# Patient Record
(demographics unavailable — no encounter records)

---

## 2024-11-15 NOTE — HISTORY OF PRESENT ILLNESS
[Yes] : Patient goes to dentist yearly [Grade ___] : Grade [unfilled] [Special Education] : special education  [Mother] : mother [Premenarche] : premenarche [Playtime (60 min/d)] : playtime 60 min a day [Participates in after-school activities] : participates in after-school activities [Has Friends] : has friends [No] : No cigarette smoke exposure [Appropriately restrained in motor vehicle] : appropriately restrained in motor vehicle [Supervised outdoor play] : supervised outdoor play [Up to date] : Up to date [Exposure to alcohol] : no exposure to alcohol [Exposure to tobacco] : no exposure to tobacco [Exposure to electronic nicotine delivery system] : No exposure to electronic nicotine delivery system [Exposure to illicit drugs] : no exposure to illicit drugs [de-identified] : Patient with hx of poor weight gain, emesis, and stomach pain. Following wth GI and endocrinology. Endorine workup negative. Previous workup for EOE with GI, patient had previously avoided dairy and prescribed PPI and cyproheptadine. Currently, not believed to have EOE,  no dietary restrictions or medicines. Patient continues to have picky eating, will eat fruit, Ramen noddle, and fish. Will intermittently refuse preferred foods.  [FreeTextEntry3] : Will sleep through the night, mom endorses patient will talk in her sleep. [FreeTextEntry8] : small hard stools, no blood in stool or distension. Denies emesis or diarrhea.  [de-identified] : Dentist over the summer, saw orthodontist last week for retained baby teeth.  [de-identified] : resource room for phonics, has an extra teacher in classroom, has IEP in the classroom.  [NO] : No

## 2024-11-15 NOTE — PHYSICAL EXAM
[Alert] : alert [No Acute Distress] : no acute distress [Normocephalic] : normocephalic [Conjunctivae with no discharge] : conjunctivae with no discharge [PERRL] : PERRL [EOMI Bilateral] : EOMI bilateral [Auricles Well Formed] : auricles well formed [Clear Tympanic membranes with present light reflex and bony landmarks] : clear tympanic membranes with present light reflex and bony landmarks [No Discharge] : no discharge [Nares Patent] : nares patent [Pink Nasal Mucosa] : pink nasal mucosa [Palate Intact] : palate intact [Nonerythematous Oropharynx] : nonerythematous oropharynx [Supple, full passive range of motion] : supple, full passive range of motion [No Palpable Masses] : no palpable masses [Symmetric Chest Rise] : symmetric chest rise [Clear to Auscultation Bilaterally] : clear to auscultation bilaterally [Regular Rate and Rhythm] : regular rate and rhythm [Normal S1, S2 present] : normal S1, S2 present [No Murmurs] : no murmurs [+2 Femoral Pulses] : +2 femoral pulses [Soft] : soft [NonTender] : non tender [Non Distended] : non distended [Normoactive Bowel Sounds] : normoactive bowel sounds [No Hepatomegaly] : no hepatomegaly [No Splenomegaly] : no splenomegaly [Patent] : patent [No fissures] : no fissures [No Abnormal Lymph Nodes Palpated] : no abnormal lymph nodes palpated [No Gait Asymmetry] : no gait asymmetry [No pain or deformities with palpation of bone, muscles, joints] : no pain or deformities with palpation of bone, muscles, joints [Normal Muscle Tone] : normal muscle tone [Straight] : straight [+2 Patella DTR] : +2 patella DTR [Cranial Nerves Grossly Intact] : cranial nerves grossly intact [No Rash or Lesions] : no rash or lesions [FreeTextEntry1] : small for age

## 2024-11-15 NOTE — REVIEW OF SYSTEMS
[Constipation] : constipation [Negative] : Genitourinary [Nausea] : no nausea [Vomiting] : no vomiting [Diarrhea] : no diarrhea

## 2024-11-15 NOTE — DISCUSSION/SUMMARY
[School] : school [Development and Mental Health] : development and mental health [Nutrition and Physical Activity] : nutrition and physical activity [Oral Health] : oral health [Safety] : safety [FreeTextEntry1] : Patient is a 10yo female with hx of poor weight gain and restricted diet presenting for 10yo [facility]. No concerns with growth or development at this time, patient gained 4lb since last year. Persistent picky eating and constipation, patient will intermittently refuse preferred foods. Endorses some stomach pain when eating certain foods, but no hx of worsening emesis or diarrhea. Constipation likely i/s/o restricted diet. Follows with GI and endocrine, current workup negative for medical etiology of symptoms. Currently with no daily medicines or recommended dietary restrictions. Continue to follow with subspecialists. Due for lipid screening, add with next lab draw with subspecialist. Behavioral concerns of inattentiveness and difficulty completing work at school, has IEP this year. UTD on vaccines, mom will make nursing appointment for flu vaccine. refused fluzone because going away for weekend-discxussed thatt here has already been a pediatric death associated with influenza-still refused  #HCM -nursing visit for flu vaccine, mom to make appointment  -lipid screening with next blood draw -return in one year for 9yo [facility] [place] or sooner if needed.   #poor weight gain -4lb weight gain since last year, appropriate for age  -continue f/u with GI and endocrinology

## 2024-12-20 NOTE — ASSESSMENT
[FreeTextEntry1] : Estelle is a 8-year 9-month  old female with PMHx of EOE who presents for follow-up of poor weight gain and short stature.    Given significant delay in bone age and growth velocity that is mildly low at 4.9 cm/year today, We will move forward with growth hormone stimulation test to rule out growth hormone deficiency.  Discussed with parent the growth hormone stimulation test. Growth hormone cannot be measured on a random blood draw as it is made in REM sleep, and needs certain medications to be given in order to stimulate the pituitary gland to produce growth hormone so that it may be measured during the day. Labs are drawn at baseline when an IV is placed and growth hormone levels are checked every 30 minutes thereafter.  The medications used include arginine which children unusually tolerate well.   The second medication is called clonidine which may cause drowsiness, and sometimes low blood pressures. For the low blood pressures, we give IV fluids throughout the test, with frequent blood pressure monitoring. For the sleepiness- parents are usually advised to cancel all activities for the rest of the day.  At time of stim also obtain IGF-I, IGFBP-3, ESR, CRP, TSH, free T4  I have also noted concerned about complains of intermittent belly pain and inability to gain weight.  I have spoken to Dr. Donovan who noted that last endoscopy was normal and she has recommended avoidance of milk products given symptoms.

## 2024-12-20 NOTE — HISTORY OF PRESENT ILLNESS
[FreeTextEntry2] : Estelle is a 8 year 9 month old female with PMHx of EOE who presents for follow-up of poor weight gain and short stature.    At initial visit at 6 years of age, it was noted that she has been seen by GI almost her whole life for poor weight gain and growth. Mother very concerned that she has very poor weight gain. Mother says she's a very good eater- will eat rice, pasta, chicken, snacks, fish; has more than three meals a day. Has also changed to cooking with avocado oil and supplementing with butter. Was given duocal supplements by GI, Estelle will often not eat them- says that pediasure also hurts her stomach and will cause her to have diarrhea.She is has just started dairy again after an eczema outbreak but eats a lot of soy milk. Per mom, she is never able jing gain more than 35 lbs. GI concerned because height velocity has also been slowing as well recently. Mom also notes that she is very active and always running around.  Review of growth charts shows steady linear growth in the 30th percentile with recent deceleration to the 7th percentile at time of initial appointment.  BMI consistently well below the 1rst percentile.  GI labs obtained in July 2021 revealed TFTs, celiac panel, inflammatory markers all within normal limits.  Given that BMI was more severely affected than linear growth, caloric optimization was recommended with close follow-up. At her last visit in November 2023, linear growth was noted in the 9th percentile, with an annualized growth velocity of 5.51 cm/year annualized from visit prior.    Mom note4 they have trialed cyproheptadine in the past but she became very fatigued.  They have been trying PediaSure 1.5 recommended by  but Estelle is sick of this taste.   Bone age was obtained after last visit and noted to be delayed to 5 years 9 months at chronological age 8 years 2 months. Estelle and mom present today for follow-up.  She has been well and denies any systemic complaints.  She has not started puberty.  She has gained 3 pounds since her last visit.  Mom notes that she gained 6 pounds but recently lost in the setting of illness which is frustrating to mom.  Linear growth continues in the 8th percentile with a growth velocity of 4.92 cm/year, annual eye since last visit.  Mom notes that at times she still does complain of belly pain specifically to whole milk.  Mom continues to be frustrated to despite eating well she does not gain any weight.  Maternal height 64 Paternal height 67 Paternal aunt <50" MPTH 63"  Family history is not notable for anyone with short stature, thyroid disease, diabetes though mom does know a history of early puberty with menarche at age 9. ESTELLE has abdominal pain and weight loss, but no headaches, no knee pain, no hip pain, no constipation, no cold intolerance, no sweating, no palpitations, no nervousness, no heat intolerance, no fatigue, no nausea and no vomiting.   maybe breast dev no hair  gained 3 lbs sine summer some odor.   may 24 	6.08 cm/yr  [employer]  cruise last week,

## 2024-12-20 NOTE — PHYSICAL EXAM
[Healthy Appearing] : healthy appearing [Well Nourished] : well nourished [Interactive] : interactive [Normal Appearance] : normal appearance [Well formed] : well formed [Normally Set] : normally set [Normal S1 and S2] : normal S1 and S2 [Clear to Ausculation Bilaterally] : clear to auscultation bilaterally [Abdomen Soft] : soft [Abdomen Tenderness] : non-tender [] : no hepatosplenomegaly [Normal] : normal  [Murmur] : no murmurs [de-identified] : + Eczema on left arm  [de-identified] : no thyromegaly or nodules noted

## 2024-12-20 NOTE — PHYSICAL EXAM
[Healthy Appearing] : healthy appearing [Well Nourished] : well nourished [Interactive] : interactive [Normal Appearance] : normal appearance [Well formed] : well formed [Normally Set] : normally set [Normal S1 and S2] : normal S1 and S2 [Clear to Ausculation Bilaterally] : clear to auscultation bilaterally [Abdomen Soft] : soft [Abdomen Tenderness] : non-tender [] : no hepatosplenomegaly [Normal] : normal  [Murmur] : no murmurs [de-identified] : + Eczema on left arm  [de-identified] : no thyromegaly or nodules noted

## 2025-01-21 NOTE — HISTORY OF PRESENT ILLNESS
[de-identified] : Sore throat, headache, emesis [FreeTextEntry6] : Estelle is a 9-year-old F coming in for fever and sore throat:   Sunday, started to have sore throat  Got worse last night.  Sunday night, nose feels stuffy  Also complaining of headache intermittent Felt a little warm yesterday, gave some Motrin yesterday.  Today, had one episode around 11am this morning Energy level decreased over the last day.  Mom has used Vicks for congestion, Motrin given for fever.  She had emesis after eating some noodles.  No one else is sick at home.

## 2025-03-19 NOTE — REASON FOR VISIT
[Initial Consultation] : an initial consultation for [Mother] : mother [FreeTextEntry3] : food allergy concern

## 2025-03-19 NOTE — HISTORY OF PRESENT ILLNESS
[de-identified] : Patient with recurrent vomiting after the ingestion of dairy products - abdominal pain with vomiting - endoscopies performed - mother told of diagnosis of EOE since early childhood - never had the sensation of food being lodged in her throat    She was treated with PPI medications with good control of her symptoms.    She has avoided dairy products - she will occasionally have pizza - cheese - ice cream and yogurt at this time with no symptoms.    Referred to endocrinology for growth delay - she is presently not being treated with growth hormone therapy.    Last month she developed fever and throat pain - HA - diarrhea - symptoms improved and then recurred - stool culture performed and negative results - her symptoms come and go now - associated with headache - sore throat - congestion.    History of eczema -   Patient has a history of campylobacter infection

## 2025-03-19 NOTE — PHYSICAL EXAM
[Alert] : alert [No Acute Distress] : no acute distress [Normal Nasal Mucosa] : the nasal mucosa was normal [No Neck Mass] : no neck mass was observed [No LAD] : no lymphadenopathy [Normal Rate and Effort] : normal respiratory rhythm and effort [No Crackles] : no crackles [Normal Rate] : heart rate was normal  [Normal S1, S2] : normal S1 and S2 [Regular Rhythm] : with a regular rhythm [Skin Intact] : skin intact  [No Cyanosis] : no cyanosis [Normal Mood] : mood was normal [Normal Affect] : affect was normal [Judgment and Insight Age Appropriate] : judgement and insight is age appropriate [Boggy Nasal Turbinates] : no boggy and/or pale nasal turbinates [Wheezing] : no wheezing was heard [de-identified] : thin female

## 2025-03-19 NOTE — SOCIAL HISTORY
[House] : [unfilled] lives in a house  [Radiator/Baseboard] : heating provided by radiator(s)/baseboard(s) [Window Units] : air conditioning provided by window units [Dog] : dog [Single] : single [FreeTextEntry1] : Lives with parents - great grandfather - sister  [Bedroom] : not in the bedroom [Living Area] : not in the living area [Smokers in Household] : there are no smokers in the home [de-identified] : 1 area rug

## 2025-03-19 NOTE — REVIEW OF SYSTEMS
[Atopic Dermatitis] : atopic dermatitis [Nl] : Genitourinary [FreeTextEntry9] : growth delay - no topical steroids

## 2025-03-19 NOTE — PHYSICAL EXAM
[Alert] : alert [No Acute Distress] : no acute distress [Normal Nasal Mucosa] : the nasal mucosa was normal [No Neck Mass] : no neck mass was observed [No LAD] : no lymphadenopathy [Normal Rate and Effort] : normal respiratory rhythm and effort [No Crackles] : no crackles [Normal Rate] : heart rate was normal  [Normal S1, S2] : normal S1 and S2 [Regular Rhythm] : with a regular rhythm [Skin Intact] : skin intact  [No Cyanosis] : no cyanosis [Normal Mood] : mood was normal [Normal Affect] : affect was normal [Judgment and Insight Age Appropriate] : judgement and insight is age appropriate [Boggy Nasal Turbinates] : no boggy and/or pale nasal turbinates [Wheezing] : no wheezing was heard [de-identified] : thin female

## 2025-03-19 NOTE — SOCIAL HISTORY
[House] : [unfilled] lives in a house  [Radiator/Baseboard] : heating provided by radiator(s)/baseboard(s) [Window Units] : air conditioning provided by window units [Dog] : dog [Single] : single [FreeTextEntry1] : Lives with parents - great grandfather - sister  [Bedroom] : not in the bedroom [Living Area] : not in the living area [Smokers in Household] : there are no smokers in the home [de-identified] : 1 area rug

## 2025-03-19 NOTE — HISTORY OF PRESENT ILLNESS
[de-identified] : Patient with recurrent vomiting after the ingestion of dairy products - abdominal pain with vomiting - endoscopies performed - mother told of diagnosis of EOE since early childhood - never had the sensation of food being lodged in her throat    She was treated with PPI medications with good control of her symptoms.    She has avoided dairy products - she will occasionally have pizza - cheese - ice cream and yogurt at this time with no symptoms.    Referred to endocrinology for growth delay - she is presently not being treated with growth hormone therapy.    Last month she developed fever and throat pain - HA - diarrhea - symptoms improved and then recurred - stool culture performed and negative results - her symptoms come and go now - associated with headache - sore throat - congestion.    History of eczema -   Patient has a history of campylobacter infection

## 2025-03-19 NOTE — REVIEW OF SYSTEMS
Alert and oriented, no focal deficits, no motor or sensory deficits. [Atopic Dermatitis] : atopic dermatitis [Nl] : Genitourinary [FreeTextEntry9] : growth delay - no topical steroids

## 2025-03-19 NOTE — ASSESSMENT
[FreeTextEntry1] : History of EOE -  History of milk intolerance - she presently is able to tolerate some dairy products Recurrent respiratory infections  No underlying environmental allergies  Follow up with PCP and GI

## 2025-07-11 NOTE — PHYSICAL EXAM
[Healthy Appearing] : healthy appearing [Well Nourished] : well nourished [Interactive] : interactive [Normal Appearance] : normal appearance [Well formed] : well formed [Normally Set] : normally set [Normal S1 and S2] : normal S1 and S2 [Clear to Ausculation Bilaterally] : clear to auscultation bilaterally [Abdomen Soft] : soft [Abdomen Tenderness] : non-tender [] : no hepatosplenomegaly [Normal] : normal  [Murmur] : no murmurs [de-identified] : + Eczema on left arm  [de-identified] : no thyromegaly or nodules noted [de-identified] : Early Joel II breast development [de-identified] : Joel I pubic hair

## 2025-07-11 NOTE — CONSULT LETTER
[Dear  ___] : Dear  [unfilled], [Consult Letter:] : I had the pleasure of evaluating your patient, [unfilled]. [Please see my note below.] : Please see my note below. [Consult Closing:] : Thank you very much for allowing me to participate in the care of this patient.  If you have any questions, please do not hesitate to contact me. [Sincerely,] : Sincerely, [FreeTextEntry3] : Stacie Patel MD  [facility] Physician [facility] Division of Pediatric Endocrinology P: [phone] F: [phone]

## 2025-07-11 NOTE — HISTORY OF PRESENT ILLNESS
[FreeTextEntry2] : Estelle is a 9-year 11-month  old female with PMHx of EOE who presents for follow-up of poor weight gain and short stature.    At initial visit at 6 years of age, it was noted that she has been seen by GI almost her whole life for poor weight gain and growth. Mother very concerned that she has very poor weight gain. Mother says she's a very good eater- will eat rice, pasta, chicken, snacks, fish; has more than three meals a day. Has also changed to cooking with avocado oil and supplementing with butter. Was given duocal supplements by GI, Estelle will often not eat them- says that pediasure also hurts her stomach and will cause her to have diarrhea.She is has just started dairy again after an eczema outbreak but eats a lot of soy milk.A initial visit, mom noted that. Per mom, Estelle is never able to gain more than 35 pounds  Review of growth charts at initial visit showed steady linear growth in the 30th percentile with recent deceleration to the 7th percentile at time of initial appointment.  BMI consistently well below the 1rst percentile.  GI labs obtained in July 2021 revealed TFTs, celiac panel, inflammatory markers all within normal limits.  Given that BMI was more severely affected than linear growth, caloric optimization was recommended with close follow-up.  Bone age was obtained after last visit and noted to be delayed to 5 years 9 months at chronological age 8 years 2 months. She presented for follow-up in May 2024 with growth velocity of 4.92 cm/year.  After this visit, growth hormone stimulation test was recommended and performed in July 2024 where peak of 12.10 ng/mL was noted, not consistent with growth hormone deficiency.  At her last visit in December 2024, growth Elsy was noted at 6.08 cm/year and she had gained 3 pounds.  Clinical follow-up was recommended. Estelle and mom present today for additional follow-up.  Mom thinks that she started with some breast development around April 2025 and notes that she has been growing very well.  Linear growth has accelerated significantly from the 10th percentile last visit the 15th percentile today with an annualized growth velocity of 7.37 cm/year.  She has also gained 3 pounds though BMI has decreased from 5th percentile last visit to the 3rd percentile today.  Mom notes that at times she experiences significant belly pain which she attributes to the EOE.  Otherwise she is a reasonable eater and typically finishes her meals.  On review of systems, she feels well and denies any systemic complaints.  She is doing her best to eat well.  Maternal height 64 Paternal height 67 Paternal aunt <50" MPTH 63"  Family history is not notable for anyone with short stature, thyroid disease, diabetes though mom does know a history of early puberty with menarche at age 9. ESTELLE has abdominal pain and weight loss, but no headaches, no knee pain, no hip pain, no constipation, no cold intolerance, no sweating, no palpitations, no nervousness, no heat intolerance, no fatigue, no nausea and no vomiting.  Mom notes today that paternal aunt is on the shorter side.

## 2025-07-11 NOTE — ASSESSMENT
[FreeTextEntry1] : Estelle is a a 9-year 11-month old female with PMHx of EOE who presents for follow-up of poor weight gain and short stature.   Full workup to date has been negative including growth hormone stimulation test in July 2024.    Estelle has experienced a growth spurt from the 10th percentile to the 15th percentile last visit the annualized growth velocity above 7 cm/year.  This is consistent with early Joel II breast development and represents the start of her physiologic growth spurt.   Will see her back in 6 months to measure growth velocity and will obtain bone age prior to next visit.